# Patient Record
Sex: FEMALE | HISPANIC OR LATINO | URBAN - METROPOLITAN AREA
[De-identification: names, ages, dates, MRNs, and addresses within clinical notes are randomized per-mention and may not be internally consistent; named-entity substitution may affect disease eponyms.]

---

## 2022-08-24 ENCOUNTER — OFFICE VISIT (OUTPATIENT)
Dept: URBAN - METROPOLITAN AREA CLINIC 111 | Facility: CLINIC | Age: 62
End: 2022-08-24

## 2022-10-05 ENCOUNTER — CLAIMS CREATED FROM THE CLAIM WINDOW (OUTPATIENT)
Dept: URBAN - METROPOLITAN AREA CLINIC 111 | Facility: CLINIC | Age: 62
End: 2022-10-05
Payer: COMMERCIAL

## 2022-10-05 ENCOUNTER — LAB OUTSIDE AN ENCOUNTER (OUTPATIENT)
Dept: URBAN - METROPOLITAN AREA CLINIC 111 | Facility: CLINIC | Age: 62
End: 2022-10-05

## 2022-10-05 ENCOUNTER — DASHBOARD ENCOUNTERS (OUTPATIENT)
Age: 62
End: 2022-10-05

## 2022-10-05 VITALS
HEART RATE: 70 BPM | TEMPERATURE: 97.9 F | WEIGHT: 141.4 LBS | HEIGHT: 64 IN | SYSTOLIC BLOOD PRESSURE: 121 MMHG | DIASTOLIC BLOOD PRESSURE: 74 MMHG | BODY MASS INDEX: 24.14 KG/M2

## 2022-10-05 DIAGNOSIS — Z12.11 COLON CANCER SCREENING: ICD-10-CM

## 2022-10-05 DIAGNOSIS — Z90.49 HISTORY OF CHOLECYSTECTOMY: ICD-10-CM

## 2022-10-05 DIAGNOSIS — R14.2 BELCHING: ICD-10-CM

## 2022-10-05 DIAGNOSIS — R14.0 ABDOMINAL BLOATING: ICD-10-CM

## 2022-10-05 DIAGNOSIS — R14.3 FLATULENCE: ICD-10-CM

## 2022-10-05 DIAGNOSIS — R10.9 ABDOMINAL PAIN OF MULTIPLE SITES: ICD-10-CM

## 2022-10-05 PROBLEM — 428882003 HISTORY OF CHOLECYSTECTOMY: Status: ACTIVE | Noted: 2022-10-05

## 2022-10-05 PROCEDURE — 99244 OFF/OP CNSLTJ NEW/EST MOD 40: CPT | Performed by: INTERNAL MEDICINE

## 2022-10-05 RX ORDER — RISEDRONATE SODIUM 150 MG/1
1 TABLET TABLET, FILM COATED ORAL ONCE A DAY
Status: ACTIVE | COMMUNITY

## 2022-10-05 NOTE — HPI-TODAY'S VISIT:
- 63 yo  female from [facility], referred by Dr. Frederic Gardner for further evaluation of GI complaints as detailed below, which have been ongoing for approximately 1 year.  A copy of this note will be sent to the referring physician. - Over the past year she has had 5-6 episodes of severe abdominal pains which start in her LLQ and then radiate to her LUQ and to her epigastrium.  It feels like a pressure sensation, 8/10 in intensity.  Tends to last 10 minutes and then goes away on its own.  She had a cholecystectomy about 7 years ago. - She notes frequent abdominal bloating, belching, and flatulence several times a week.  She denies diarrhea or constipation. - Denies heartburn symptoms or history of chronic GERD - Denies hematochezia, change in bowel habits, or weight loss - Denies family history of GI malignancies in any first degree relatives - Patient denies a prior history of colon polyps.  She had a negative screening colonoscopy with Dr. Bains in January 2017.  Dr. Bains recommended a repeat screening colonoscopy in 5 years.

## 2022-10-11 ENCOUNTER — TELEPHONE ENCOUNTER (OUTPATIENT)
Dept: URBAN - METROPOLITAN AREA CLINIC 111 | Facility: CLINIC | Age: 62
End: 2022-10-11

## 2022-12-15 ENCOUNTER — TELEPHONE ENCOUNTER (OUTPATIENT)
Dept: URBAN - METROPOLITAN AREA CLINIC 111 | Facility: CLINIC | Age: 62
End: 2022-12-15

## 2023-01-25 ENCOUNTER — OFFICE VISIT (OUTPATIENT)
Dept: URBAN - METROPOLITAN AREA CLINIC 111 | Facility: CLINIC | Age: 63
End: 2023-01-25

## 2023-10-19 ENCOUNTER — TELEPHONE ENCOUNTER (OUTPATIENT)
Dept: URBAN - METROPOLITAN AREA CLINIC 111 | Facility: CLINIC | Age: 63
End: 2023-10-19

## 2023-11-02 ENCOUNTER — OFFICE VISIT (OUTPATIENT)
Dept: URBAN - METROPOLITAN AREA CLINIC 111 | Facility: CLINIC | Age: 63
End: 2023-11-02

## 2024-04-15 NOTE — PHYSICAL EXAM CHEST:
no lesions,  no deformities,  no traumatic injuries,  no significant scars are present,  chest wall non-tender,  no masses present, breathing is unlabored without accessory muscle use,normal breath sounds
Send Procedure Quote As Charge: No
Procedure Quote $ (Will Render In Note. Use Numbers Only, No Text Please.): 329
Detail Level: Detailed